# Patient Record
Sex: FEMALE | Race: WHITE
[De-identification: names, ages, dates, MRNs, and addresses within clinical notes are randomized per-mention and may not be internally consistent; named-entity substitution may affect disease eponyms.]

---

## 2019-01-17 ENCOUNTER — HOSPITAL ENCOUNTER (INPATIENT)
Dept: HOSPITAL 15 - ER | Age: 74
LOS: 2 days | Discharge: HOME | DRG: 605 | End: 2019-01-19
Attending: INTERNAL MEDICINE | Admitting: INTERNAL MEDICINE
Payer: MEDICARE

## 2019-01-17 VITALS — WEIGHT: 162.48 LBS | HEIGHT: 69 IN | BODY MASS INDEX: 24.07 KG/M2

## 2019-01-17 VITALS — SYSTOLIC BLOOD PRESSURE: 105 MMHG | DIASTOLIC BLOOD PRESSURE: 50 MMHG

## 2019-01-17 VITALS — SYSTOLIC BLOOD PRESSURE: 127 MMHG | DIASTOLIC BLOOD PRESSURE: 73 MMHG

## 2019-01-17 DIAGNOSIS — I48.91: ICD-10-CM

## 2019-01-17 DIAGNOSIS — F17.210: ICD-10-CM

## 2019-01-17 DIAGNOSIS — S70.01XA: Primary | ICD-10-CM
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
## 2019-01-17 DIAGNOSIS — Z88.8: ICD-10-CM
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
## 2019-01-17 DIAGNOSIS — F40.240: ICD-10-CM

## 2019-01-17 DIAGNOSIS — Z90.49: ICD-10-CM
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
## 2019-01-17 DIAGNOSIS — E11.65: ICD-10-CM

## 2019-01-17 LAB
ALBUMIN SERPL-MCNC: 3.7 G/DL (ref 3.4–5)
ALP SERPL-CCNC: 97 U/L (ref 45–117)
ALT SERPL-CCNC: 25 U/L (ref 13–56)
ANION GAP SERPL CALCULATED.3IONS-SCNC: 6 MMOL/L (ref 5–15)
BILIRUB SERPL-MCNC: 0.5 MG/DL (ref 0.2–1)
BUN SERPL-MCNC: 19 MG/DL (ref 7–18)
BUN/CREAT SERPL: 20.7
CALCIUM SERPL-MCNC: 8.6 MG/DL (ref 8.5–10.1)
CHLORIDE SERPL-SCNC: 106 MMOL/L (ref 98–107)
CO2 SERPL-SCNC: 26 MMOL/L (ref 21–32)
GLUCOSE SERPL-MCNC: 221 MG/DL (ref 74–106)
HCT VFR BLD AUTO: 43.7 % (ref 36–46)
HCT VFR BLD AUTO: 45.3 % (ref 36–46)
HGB BLD-MCNC: 15 G/DL (ref 12.2–16.2)
HGB BLD-MCNC: 15.6 G/DL (ref 12.2–16.2)
MCH RBC QN AUTO: 32.8 PG (ref 28–32)
MCV RBC AUTO: 95 FL (ref 80–100)
NRBC BLD QL AUTO: 0.1 %
POTASSIUM SERPL-SCNC: 4 MMOL/L (ref 3.5–5.1)
PROT SERPL-MCNC: 6.9 G/DL (ref 6.4–8.2)
SODIUM SERPL-SCNC: 138 MMOL/L (ref 136–145)

## 2019-01-17 PROCEDURE — 97530 THERAPEUTIC ACTIVITIES: CPT

## 2019-01-17 PROCEDURE — 83036 HEMOGLOBIN GLYCOSYLATED A1C: CPT

## 2019-01-17 PROCEDURE — 97110 THERAPEUTIC EXERCISES: CPT

## 2019-01-17 PROCEDURE — 85014 HEMATOCRIT: CPT

## 2019-01-17 PROCEDURE — 96374 THER/PROPH/DIAG INJ IV PUSH: CPT

## 2019-01-17 PROCEDURE — 73502 X-RAY EXAM HIP UNI 2-3 VIEWS: CPT

## 2019-01-17 PROCEDURE — 85730 THROMBOPLASTIN TIME PARTIAL: CPT

## 2019-01-17 PROCEDURE — 96375 TX/PRO/DX INJ NEW DRUG ADDON: CPT

## 2019-01-17 PROCEDURE — 85025 COMPLETE CBC W/AUTO DIFF WBC: CPT

## 2019-01-17 PROCEDURE — 80048 BASIC METABOLIC PNL TOTAL CA: CPT

## 2019-01-17 PROCEDURE — 73700 CT LOWER EXTREMITY W/O DYE: CPT

## 2019-01-17 PROCEDURE — 85018 HEMOGLOBIN: CPT

## 2019-01-17 PROCEDURE — 84484 ASSAY OF TROPONIN QUANT: CPT

## 2019-01-17 PROCEDURE — 94761 N-INVAS EAR/PLS OXIMETRY MLT: CPT

## 2019-01-17 PROCEDURE — 85610 PROTHROMBIN TIME: CPT

## 2019-01-17 PROCEDURE — 80053 COMPREHEN METABOLIC PANEL: CPT

## 2019-01-17 PROCEDURE — 82962 GLUCOSE BLOOD TEST: CPT

## 2019-01-17 PROCEDURE — 36415 COLL VENOUS BLD VENIPUNCTURE: CPT

## 2019-01-17 PROCEDURE — 97116 GAIT TRAINING THERAPY: CPT

## 2019-01-17 PROCEDURE — 97163 PT EVAL HIGH COMPLEX 45 MIN: CPT

## 2019-01-17 PROCEDURE — 81001 URINALYSIS AUTO W/SCOPE: CPT

## 2019-01-17 PROCEDURE — 93005 ELECTROCARDIOGRAM TRACING: CPT

## 2019-01-17 RX ADMIN — Medication SCH STRIP: at 18:10

## 2019-01-17 RX ADMIN — Medication SCH STRIP: at 22:00

## 2019-01-17 RX ADMIN — HUMAN INSULIN SCH UNITS: 100 INJECTION, SOLUTION SUBCUTANEOUS at 22:00

## 2019-01-17 RX ADMIN — METOPROLOL TARTRATE SCH MG: 25 TABLET, FILM COATED ORAL at 22:00

## 2019-01-17 RX ADMIN — SODIUM CHLORIDE PRN MG: 9 INJECTION, SOLUTION INTRAVENOUS at 19:52

## 2019-01-17 RX ADMIN — SODIUM CHLORIDE ONE MG: 9 INJECTION, SOLUTION INTRAVENOUS at 13:59

## 2019-01-17 RX ADMIN — HUMAN INSULIN SCH UNITS: 100 INJECTION, SOLUTION SUBCUTANEOUS at 18:10

## 2019-01-17 RX ADMIN — SODIUM CHLORIDE ONE MG: 9 INJECTION, SOLUTION INTRAVENOUS at 13:29

## 2019-01-17 NOTE — NUR
Patient is resting comfortably in bed with eyes closed. 

No signs or symptoms of distress noted at this time. Will continue to monitor Q1 hour and 
PRN.

## 2019-01-17 NOTE — NUR
Opening Note

Received report from day shift RN. Patient is awake, alert and oriented x4. No signs or 
symptoms of distress or shortness of breath noted at this time. Patient states right hip 
pain 10/10 and is requesting pain medications. Reviewed plan of care with patient, patient 
verbalized understanding. Bed in low and locked position, call light within reach. Will 
continue to monitor Q1h hour and PRN.

## 2019-01-17 NOTE — NUR
Skin

Patient skin assessed, pink small area noted to right knee, small ecchymosis area noted to 
left wrist and blanchable redness noted to sacrum, pt instructed to attempt to turn Q2hrs or 
often as she could to prevent skin breakdown, pt verbalized understanding, and could 
demonstrate how to turn self, call light within reach, bed alarm activated, cont care

## 2019-01-17 NOTE — NUR
Patient refused medications

Patient refused metoprolol, states she "only takes it once a day and already took it this 
morning" Blood pressure is 105/50, and heart rate is 73. Patient refused accuchecks. States 
"I don't want to check it, and i don't take insulin at home" Educated patient on importance 
of checking blood sugar, patient continues to refuse. Will continue to monitor Q1 hour and 
PRN.

## 2019-01-17 NOTE — NUR
Telemetry admit from [facility] admitted to Telemetry unit after SBAR received.  Patient oriented to Elma Nielsen primary RN, unit, room, bed, and unit policies regarding patient care and visiting 
hours. Patient now on continuous telemetry monitoring, tele box # HC2 and telemetry reading 
on arrival to unit is SR 76. Patient currently on RA, no c/o of SOB, patient weighed by 
bedscale and encouraged to call if they need something, call light within reach.  All 
questions and concerns addressed, patient verbalized understanding.

## 2019-01-18 VITALS — DIASTOLIC BLOOD PRESSURE: 54 MMHG | SYSTOLIC BLOOD PRESSURE: 107 MMHG

## 2019-01-18 VITALS — SYSTOLIC BLOOD PRESSURE: 104 MMHG | DIASTOLIC BLOOD PRESSURE: 48 MMHG

## 2019-01-18 VITALS — SYSTOLIC BLOOD PRESSURE: 139 MMHG | DIASTOLIC BLOOD PRESSURE: 73 MMHG

## 2019-01-18 VITALS — SYSTOLIC BLOOD PRESSURE: 104 MMHG | DIASTOLIC BLOOD PRESSURE: 53 MMHG

## 2019-01-18 VITALS — DIASTOLIC BLOOD PRESSURE: 59 MMHG | SYSTOLIC BLOOD PRESSURE: 118 MMHG

## 2019-01-18 LAB
HCT VFR BLD AUTO: 40 % (ref 36–46)
HGB BLD-MCNC: 13.9 G/DL (ref 12.2–16.2)
MCH RBC QN AUTO: 32.6 PG (ref 28–32)
MCV RBC AUTO: 93.9 FL (ref 80–100)
NRBC BLD QL AUTO: 0 %

## 2019-01-18 RX ADMIN — METOPROLOL TARTRATE SCH MG: 25 TABLET, FILM COATED ORAL at 09:38

## 2019-01-18 RX ADMIN — HUMAN INSULIN SCH UNITS: 100 INJECTION, SOLUTION SUBCUTANEOUS at 16:40

## 2019-01-18 RX ADMIN — HUMAN INSULIN SCH UNITS: 100 INJECTION, SOLUTION SUBCUTANEOUS at 11:30

## 2019-01-18 RX ADMIN — SODIUM CHLORIDE PRN MG: 9 INJECTION, SOLUTION INTRAVENOUS at 12:39

## 2019-01-18 RX ADMIN — SODIUM CHLORIDE SCH MLS/HR: 0.9 INJECTION, SOLUTION INTRAVENOUS at 19:08

## 2019-01-18 RX ADMIN — SODIUM CHLORIDE SCH MLS/HR: 0.9 INJECTION, SOLUTION INTRAVENOUS at 05:48

## 2019-01-18 RX ADMIN — PANTOPRAZOLE SODIUM SCH MG: 40 TABLET, DELAYED RELEASE ORAL at 09:38

## 2019-01-18 RX ADMIN — HUMAN INSULIN SCH UNITS: 100 INJECTION, SOLUTION SUBCUTANEOUS at 06:17

## 2019-01-18 RX ADMIN — Medication SCH STRIP: at 21:27

## 2019-01-18 RX ADMIN — Medication SCH STRIP: at 16:39

## 2019-01-18 RX ADMIN — Medication SCH STRIP: at 06:17

## 2019-01-18 RX ADMIN — HUMAN INSULIN SCH UNITS: 100 INJECTION, SOLUTION SUBCUTANEOUS at 21:27

## 2019-01-18 RX ADMIN — SODIUM CHLORIDE PRN MG: 9 INJECTION, SOLUTION INTRAVENOUS at 01:27

## 2019-01-18 RX ADMIN — SODIUM CHLORIDE SCH MLS/HR: 0.9 INJECTION, SOLUTION INTRAVENOUS at 06:54

## 2019-01-18 RX ADMIN — Medication SCH STRIP: at 11:30

## 2019-01-18 NOTE — NUR
Closing Note

Report given to day shift RN. Patient is awake in bed, eating breakfast. No signs or 
symptoms of distress noted at this time.

## 2019-01-18 NOTE — NUR
Patient resting comfortably in bed with eyes closed

No signs or symptoms of distress noted at this time. Will continue to monitor Q1 hour and 
PRN.

## 2019-01-18 NOTE — NUR
SHIFT REPORT

Shift report given to night nurse. Pt resting in bed. bed ini low pos., locked, rails up x 
2, call light in reach.

## 2019-01-18 NOTE — NUR
IV

IV infiltrated.  Removed it and site is benign.  Attempted to restart but the vein 
collapsed.  Pt wants another person to restart her IV.  Asked the charge nurse and one will 
come to attempt it after their report.  Explained this to the patient.

## 2019-01-18 NOTE — NUR
Patient refused lab draw

Re-draw ordered for H&H, patient refused. States "she just wants to sleep." Spoke with lab 
tech, he will attempt again later. Will continue to monitor Q1 hour and PRN.

## 2019-01-18 NOTE — NUR
Opening Note

Received report from day shift RN. Patient is awake, alert and oriented x4. No signs or 
symptoms of distress, or shortness of breath noted at this time. Patient states right hip 
pain 4/10 at this time and is requesting pain medications. Reviewed plan of care with 
patient, patient verbalized understanding. Bed in low and locked position, call light within 
reach. Will continue to monitor Q1 hour and PRN.

## 2019-01-18 NOTE — NUR
Patient refusing IV insertion 

Patient currently has no IV access and is refusing IV reinsertion at this time. Patient 
states " I would rather wait until later. I just want to rest now."  Will continue to 
monitor Q1 hour and PRN.

## 2019-01-18 NOTE — NUR
Opening Note

Received report from night nurse.  Pt resting in bed awake and alert, bed in low pos., 
locked, rails up x2, call light in reach.  Having pain to right hip.  will medicate for 
pain.

## 2019-01-18 NOTE — NUR
Patient refused lab draw again

Patient states she "doesn't understand the need for blood work, she only came in for a fall" 
Patient educated, continues to refuse.

## 2019-01-19 VITALS — SYSTOLIC BLOOD PRESSURE: 139 MMHG | DIASTOLIC BLOOD PRESSURE: 77 MMHG

## 2019-01-19 VITALS — DIASTOLIC BLOOD PRESSURE: 54 MMHG | SYSTOLIC BLOOD PRESSURE: 130 MMHG

## 2019-01-19 VITALS — SYSTOLIC BLOOD PRESSURE: 142 MMHG | DIASTOLIC BLOOD PRESSURE: 103 MMHG

## 2019-01-19 VITALS — DIASTOLIC BLOOD PRESSURE: 64 MMHG | SYSTOLIC BLOOD PRESSURE: 114 MMHG

## 2019-01-19 LAB
ANION GAP SERPL CALCULATED.3IONS-SCNC: 3 MMOL/L (ref 5–15)
APTT PPP: 25.4 SEC (ref 23.78–33.04)
BUN SERPL-MCNC: 12 MG/DL (ref 7–18)
BUN/CREAT SERPL: 17.1
CALCIUM SERPL-MCNC: 7.9 MG/DL (ref 8.5–10.1)
CHLORIDE SERPL-SCNC: 109 MMOL/L (ref 98–107)
CO2 SERPL-SCNC: 26 MMOL/L (ref 21–32)
GLUCOSE SERPL-MCNC: 171 MG/DL (ref 74–106)
HCT VFR BLD AUTO: 39.1 % (ref 36–46)
HGB BLD-MCNC: 13.6 G/DL (ref 12.2–16.2)
INR PPP: 0.93 (ref 0.9–1.15)
MCH RBC QN AUTO: 32.8 PG (ref 28–32)
MCV RBC AUTO: 93.9 FL (ref 80–100)
NRBC BLD QL AUTO: 0.1 %
POTASSIUM SERPL-SCNC: 4.1 MMOL/L (ref 3.5–5.1)
PROTHROMBIN TIME: 10 SEC (ref 9.27–12.13)
SODIUM SERPL-SCNC: 138 MMOL/L (ref 136–145)

## 2019-01-19 RX ADMIN — PANTOPRAZOLE SODIUM SCH MG: 40 TABLET, DELAYED RELEASE ORAL at 09:55

## 2019-01-19 RX ADMIN — Medication SCH STRIP: at 12:18

## 2019-01-19 RX ADMIN — HUMAN INSULIN SCH UNITS: 100 INJECTION, SOLUTION SUBCUTANEOUS at 16:58

## 2019-01-19 RX ADMIN — Medication SCH STRIP: at 16:57

## 2019-01-19 RX ADMIN — HUMAN INSULIN SCH UNITS: 100 INJECTION, SOLUTION SUBCUTANEOUS at 06:59

## 2019-01-19 RX ADMIN — HUMAN INSULIN SCH UNITS: 100 INJECTION, SOLUTION SUBCUTANEOUS at 12:18

## 2019-01-19 RX ADMIN — Medication SCH STRIP: at 06:59

## 2019-01-19 NOTE — NUR
MD VISIT

Yesterday, Patient told me that Dr. Kamara was in and was going view the x-rays to see if 
she was in need of any surgery.

## 2019-01-19 NOTE — NUR
DISCHARGE

CALLED GRISELDA HAYDEN OF ER AND EXPLAINED THAT THE DOCTOR DID NOT ORDER GLUCOMETER NOR 
SLIDING SCALE AND INSULIN.  HE SAID FOR PT TO FOLLOW UP WITH HER DOCTOR.  THIS IS EXPLAINED 
TO THE PATIENT.  PATIENT HAS ALL HER DISCHARGE INSTRUCTIONS.  IV AND TELE WERE REMOVED 
YESTERDAY.  HER FAMILY IS HERE TO TAKE HER HOME.  PT HAS HER PRESCRIPTIONS IN HAND, AND ALL 
HER BELONGINGS.  SHE KNOWS TO CALL HER DOCTOR ON MONDAY FOR A FOLLOW UP APPT.

## 2019-01-19 NOTE — NUR
Pt demo'd good shantel to ambulation w/ FWW. Pt did experience nausea after gait training. Pt is 
wanting to go home because she is her spouses primary caretaker.  Pt is good to D/C to home

-------------------------------------------------------------------------------

Addendum: 01/19/19 at 1417 by Sue Woods PT

-------------------------------------------------------------------------------

Amended: Links added.

## 2019-01-19 NOTE — NUR
Closing Note

Report given to day shift RN. Patient is awake, alert and oriented x4. No signs or symptoms 
of distress noted at this time.

## 2019-01-19 NOTE — NUR
WHEELED OUT

PATIENT WAS WHEELED OUT ACCOMP. BY RN TO THE VEHICLE.  THE SON WAS DRIVING.  PT HAS ALL HER 
BELONGINGS INCLUDING  CELL PHONE AND DISCHARGE INSTRUCTIONS.  NO PROBLEM GETTING INTO THE 
SUV.

## 2019-01-19 NOTE — NUR
Shift report

Shift report received from night RN Summer.  Pt resting in bed with eyes open, talking.  Bed 
in low pos., locked, rails up x 2, call light in reach.  Continue to monitor closely.